# Patient Record
Sex: FEMALE | Race: WHITE | Employment: STUDENT | URBAN - METROPOLITAN AREA
[De-identification: names, ages, dates, MRNs, and addresses within clinical notes are randomized per-mention and may not be internally consistent; named-entity substitution may affect disease eponyms.]

---

## 2017-10-24 PROBLEM — N92.6 IRREGULAR MENSES: Status: ACTIVE | Noted: 2017-10-24

## 2017-10-24 PROBLEM — Z01.419 WOMEN'S ANNUAL ROUTINE GYNECOLOGICAL EXAMINATION: Status: ACTIVE | Noted: 2017-10-24

## 2017-11-07 PROBLEM — Z00.00 ANNUAL PHYSICAL EXAM: Status: ACTIVE | Noted: 2017-11-07

## 2017-11-14 PROBLEM — Z30.432 ENCOUNTER FOR IUD REMOVAL: Status: ACTIVE | Noted: 2017-11-14

## 2018-06-04 PROBLEM — Z30.432 ENCOUNTER FOR IUD REMOVAL: Status: RESOLVED | Noted: 2017-11-14 | Resolved: 2018-06-04

## 2018-06-04 PROBLEM — E66.01 SEVERE OBESITY (BMI 35.0-39.9): Status: ACTIVE | Noted: 2018-06-04

## 2020-02-29 ENCOUNTER — HOSPITAL ENCOUNTER (OUTPATIENT)
Age: 30
Discharge: HOME | End: 2020-02-29
Attending: OBSTETRICS & GYNECOLOGY | Admitting: OBSTETRICS & GYNECOLOGY
Payer: COMMERCIAL

## 2020-02-29 VITALS
HEART RATE: 84 BPM | WEIGHT: 230 LBS | SYSTOLIC BLOOD PRESSURE: 143 MMHG | BODY MASS INDEX: 36.1 KG/M2 | RESPIRATION RATE: 18 BRPM | HEIGHT: 67 IN | DIASTOLIC BLOOD PRESSURE: 74 MMHG | TEMPERATURE: 97.5 F

## 2020-02-29 PROBLEM — O20.9 VAGINAL BLEEDING BEFORE 22 WEEKS GESTATION: Status: ACTIVE | Noted: 2020-02-29

## 2020-02-29 LAB
GLUCOSE, GLUUPC: NEGATIVE
KETONES UR-MCNC: NORMAL MG/DL
PROT UR QL: NORMAL

## 2020-02-29 PROCEDURE — 81002 URINALYSIS NONAUTO W/O SCOPE: CPT | Performed by: OBSTETRICS & GYNECOLOGY

## 2020-02-29 PROCEDURE — 99284 EMERGENCY DEPT VISIT MOD MDM: CPT

## 2020-02-29 RX ORDER — METFORMIN HYDROCHLORIDE 500 MG/1
500 TABLET ORAL
COMMUNITY

## 2020-02-29 NOTE — PROGRESS NOTES
Discharge instructions reviewed with patient and spouse, both verbalize understanding; opportunity to ask questions and voice concerns offered   Patient discharged in stable condition with spouse

## 2020-02-29 NOTE — PROGRESS NOTES
Pt to [facility] ED with report of bright red bleeding this morning when \"wiped\"  Patient denies pain or abdominal cramping at present.   Patient is a G1 with infertility issues, this baby is IVF  Hx of vaginal bleeding at 12 weeks with a \"possible placenta previa\"  Patient has been on pelvic rest since 12 weeks gestation

## 2020-02-29 NOTE — DISCHARGE INSTRUCTIONS
Patient Education     Vaginal Bleeding After the First Trimester of Pregnancy: After Your Visit  Your Care Instructions  Several things can cause bleeding in the second or third trimester of pregnancy. Some of them are serious, but others are not a cause for worry. In some cases, the cause of bleeding is not known. You do not have one of the serious conditions that can cause bleeding in pregnancy. Your baby is fine. However, you need to contact your doctor about any future bleeding, which could be a sign of  labor or other problems. Follow-up care is a key part of your treatment and safety. Be sure to make and go to all appointments, and call your doctor if you are having problems. Its also a good idea to know your test results and keep a list of the medicines you take. How can you care for yourself at home? · Do not have sex until your doctor says it is safe. · Follow your doctor's instructions about how much activity is safe for you. When should you call for help? Call 911 anytime you think you may need emergency care. For example, call if:  · You passed out (lost consciousness). · You have severe vaginal bleeding. · You have severe pain in your belly or pelvis. · You have had fluid gushing or leaking from your vagina and you know or think the umbilical cord is bulging into your vagina. If this happens, immediately get down on your knees so your rear end (buttocks) is higher than your head. This will decrease the pressure on the cord until help arrives. Call your doctor now or seek immediate medical care if:  · You have signs of preeclampsia, such as:  ¨ Sudden swelling of your face, hands, or feet. ¨ New vision problems (such as dimness or blurring). ¨ A severe headache. · You have any vaginal bleeding. · You have belly pain or cramping. · You have a fever. · You have had regular contractions (with or without pain) for an hour.  This means that you have 8 or more within 1 hour or 4 or more in 20 minutes after you change your position and drink fluids. · You have a sudden release of fluid from the vagina. · You have low back pain or pelvic pressure that does not go away. · You notice that your baby has stopped moving or is moving much less than normal.  Watch closely for changes in your health, and be sure to contact your doctor if:  · You do not get better as expected. Where can you learn more? Go to IRIS.TV.be  Enter I318 in the search box to learn more about \"Vaginal Bleeding After the First Trimester of Pregnancy: After Your Visit. \"   © 4294-6687 Healthwise, Incorporated. Care instructions adapted under license by [facility] [employer] (which disclaims liability or warranty for this information). This care instruction is for use with your licensed healthcare professional. If you have questions about a medical condition or this instruction, always ask your healthcare professional. [address] any warranty or liability for your use of this information. Content Version: 9.4.50731;  Last Revised: March 24, 2011        KEEP NEXT SCHEDULED APPOINTMENT WITH [facility]  RETURN TO OB ED IF BRIGHT RED HEAVY BLEEDING OR ANY CONCERNS

## 2020-02-29 NOTE — H&P
Chief Complaint   Patient presents with    Vaginal Bleeding       27 y.o. female  at 17w3d  weeks gestation who requests evaluation for baginal spotting  Severity: mild  Location: vaginal  Duration : 4 hours  Modifying factors: none   Associated s/s: none    Her pregnancy issues include: ivf pregnancy, had spotting before at 12 weeks. Possible with low lying placenta.       HISTORY:  OB History    Para Term  AB Living   1 0 0 0 0 0   SAB TAB Ectopic Molar Multiple Live Births   0 0 0 0 0 0      # Outcome Date GA Lbr Julio/2nd Weight Sex Delivery Anes PTL Lv   1 Current                Past Surgical History:   Procedure Laterality Date    HX OTHER SURGICAL      wisdom teeth     HX WISDOM TEETH EXTRACTION         Past Medical History:   Diagnosis Date    Anxiety 2013    Decreased libido 2013    Elevated blood pressure (not hypertension) 2013    History of chicken pox     Hyperlipidemia 2013    Infertility, female     iVF  [address] Weight gain 2013       No Known Allergies    Family History   Problem Relation Age of Onset    Elevated Lipids Mother     Hypertension Maternal Grandmother     Heart Disease Maternal Grandmother     Diabetes Maternal Grandmother     Stroke Maternal Grandmother     Stroke Maternal Grandfather     Heart Disease Maternal Grandfather     Diabetes Maternal Grandfather     Ovarian Cancer Paternal Aunt     Glaucoma Father     Colon Cancer Neg Hx     Breast Cancer Neg Hx        Social History     Socioeconomic History    Marital status:      Spouse name: Not on file    Number of children: 0    Years of education: bachelors    Highest education level: Not on file   Occupational History    Occupation:    Social Needs    Financial resource strain: Not on file    Food insecurity:     Worry: Not on file     Inability: Not on file    Transportation needs:     Medical: Not on file     Non-medical: Not on file Tobacco Use    Smoking status: Never Smoker    Smokeless tobacco: Never Used   Substance and Sexual Activity    Alcohol use: Yes     Alcohol/week: 2.5 standard drinks     Types: 3 Glasses of wine per week    Drug use: No    Sexual activity: Yes     Partners: Male     Birth control/protection: I.U.D. Comment: paragard 10/16/14   Lifestyle    Physical activity:     Days per week: Not on file     Minutes per session: Not on file    Stress: Not on file   Relationships    Social connections:     Talks on phone: Not on file     Gets together: Not on file     Attends Alevism service: Not on file     Active member of club or organization: Not on file     Attends meetings of clubs or organizations: Not on file     Relationship status: Not on file    Intimate partner violence:     Fear of current or ex partner: Not on file     Emotionally abused: Not on file     Physically abused: Not on file     Forced sexual activity: Not on file   Other Topics Concern     Service Not Asked    Blood Transfusions Not Asked    Caffeine Concern No    Occupational Exposure Not Asked   Dinorah Eglin Hazards Not Asked    Sleep Concern Not Asked    Stress Concern Not Asked    Weight Concern Not Asked    Special Diet Not Asked    Back Care Not Asked    Exercise No    Bike Helmet Not Asked    Seat Belt Not Asked    Self-Exams No   Social History Narrative    Lives with      Denies h/o DV or sexual abuse       ROS:  Negative:   negative 10 point ROS except as noted in HPI    Positive:   per hpi    PHYSICAL EXAM:  Blood pressure 143/74, pulse 84, temperature 97.5 °F (36.4 °C), resp. rate 18, height 5' 6.5\" (1.689 m), weight 104.3 kg (230 lb). The patient appears well, alert, oriented x 3. Appropriate affect. Lungs are clear. Heart RRR, no murmurs. Abdomen soft, non-tender, no rebound/guarding, normoactive bs.   Fundus soft and non tender  Skin warm, dry, no rashes  Ext no edema, DTR's normal  SSE: cervix nullip, has scant amount of blood tinged mucus. Fetal Heart Rate: doppler    Recent Results (from the past 24 hour(s))   POC URINE DIPSTICK MANUAL    Collection Time: 02/29/20  9:57 AM   Result Value Ref Range    Protein (POC) 30 mg/dL Negative    Glucose, urine (POC) Negative Negative    Ketones (POC) > 160 md/dL Negative        TVUS: cervical length 3.2-3.4 on 3 measurements. i performed and read the us. pictures scanned into record. no previa noted. I have personally reviewed the patient's history, prenatal record, and pertinent test results. laboratory results, previous provider notes support my clinical impression. Assessment:  27 y.o. female at 17w3d  Vaginal spotting of undetermined cause    Plan:  Findings and test results were discussed. Fu as scheduled. Continue pelvic rest and return for heavy bleeding, pain, new sx.     Signed By:  Jorje Vera MD     February 29, 2020

## 2020-02-29 NOTE — PROGRESS NOTES
Patient up to bathroom to obtain urine specimen  Patient denies dysuria, urgency, frequency, low back pain, or suprapubic pressure  Patient called RN to bathroom to show amount of blood on toilet tissue; bleeding scant and light pink at present time.

## 2020-03-22 ENCOUNTER — HOSPITAL ENCOUNTER (OUTPATIENT)
Age: 30
Setting detail: OBSERVATION
Discharge: HOME | End: 2020-03-22
Attending: OBSTETRICS & GYNECOLOGY | Admitting: OBSTETRICS & GYNECOLOGY
Payer: COMMERCIAL

## 2020-03-22 VITALS
SYSTOLIC BLOOD PRESSURE: 116 MMHG | OXYGEN SATURATION: 99 % | TEMPERATURE: 98.4 F | DIASTOLIC BLOOD PRESSURE: 57 MMHG | HEART RATE: 71 BPM | RESPIRATION RATE: 18 BRPM

## 2020-03-22 PROBLEM — O44.02 PLACENTA PREVIA IN SECOND TRIMESTER: Status: ACTIVE | Noted: 2020-03-22

## 2020-03-22 PROBLEM — O46.92 VAGINAL BLEEDING IN PREGNANCY, SECOND TRIMESTER: Status: ACTIVE | Noted: 2020-03-22

## 2020-03-22 LAB
ABO + RH BLD: NORMAL
BASOPHILS # BLD: 0 K/UL (ref 0–0.2)
BASOPHILS NFR BLD: 0 % (ref 0–2)
BLOOD GROUP ANTIBODIES SERPL: NORMAL
DIFFERENTIAL METHOD BLD: ABNORMAL
EOSINOPHIL # BLD: 0 K/UL (ref 0–0.8)
EOSINOPHIL NFR BLD: 0 % (ref 0.5–7.8)
ERYTHROCYTE [DISTWIDTH] IN BLOOD BY AUTOMATED COUNT: 13.3 % (ref 11.9–14.6)
HCT VFR BLD AUTO: 33.1 % (ref 35.8–46.3)
HGB BLD-MCNC: 11 G/DL (ref 11.7–15.4)
IMM GRANULOCYTES # BLD AUTO: 0.1 K/UL (ref 0–0.5)
IMM GRANULOCYTES NFR BLD AUTO: 1 % (ref 0–5)
LYMPHOCYTES # BLD: 2.4 K/UL (ref 0.5–4.6)
LYMPHOCYTES NFR BLD: 22 % (ref 13–44)
MCH RBC QN AUTO: 27.9 PG (ref 26.1–32.9)
MCHC RBC AUTO-ENTMCNC: 33.2 G/DL (ref 31.4–35)
MCV RBC AUTO: 84 FL (ref 79.6–97.8)
MONOCYTES # BLD: 0.7 K/UL (ref 0.1–1.3)
MONOCYTES NFR BLD: 7 % (ref 4–12)
NEUTS SEG # BLD: 7.7 K/UL (ref 1.7–8.2)
NEUTS SEG NFR BLD: 70 % (ref 43–78)
NRBC # BLD: 0 K/UL (ref 0–0.2)
PLATELET # BLD AUTO: 205 K/UL (ref 150–450)
PMV BLD AUTO: 10.7 FL (ref 9.4–12.3)
RBC # BLD AUTO: 3.94 M/UL (ref 4.05–5.2)
SPECIMEN EXP DATE BLD: NORMAL
WBC # BLD AUTO: 11 K/UL (ref 4.3–11.1)

## 2020-03-22 PROCEDURE — 76815 OB US LIMITED FETUS(S): CPT | Performed by: OBSTETRICS & GYNECOLOGY

## 2020-03-22 PROCEDURE — 99282 EMERGENCY DEPT VISIT SF MDM: CPT | Performed by: OBSTETRICS & GYNECOLOGY

## 2020-03-22 PROCEDURE — 85025 COMPLETE CBC W/AUTO DIFF WBC: CPT

## 2020-03-22 PROCEDURE — 76817 TRANSVAGINAL US OBSTETRIC: CPT | Performed by: OBSTETRICS & GYNECOLOGY

## 2020-03-22 PROCEDURE — 86900 BLOOD TYPING SEROLOGIC ABO: CPT

## 2020-03-22 PROCEDURE — 99218 HC RM OBSERVATION: CPT

## 2020-03-22 RX ORDER — SODIUM CHLORIDE 0.9 % (FLUSH) 0.9 %
5-40 SYRINGE (ML) INJECTION EVERY 8 HOURS
Status: DISCONTINUED | OUTPATIENT
Start: 2020-03-22 | End: 2020-03-22 | Stop reason: HOSPADM

## 2020-03-22 RX ORDER — ACETAMINOPHEN 325 MG/1
650 TABLET ORAL
Status: DISCONTINUED | OUTPATIENT
Start: 2020-03-22 | End: 2020-03-22 | Stop reason: HOSPADM

## 2020-03-22 RX ORDER — SODIUM CHLORIDE 0.9 % (FLUSH) 0.9 %
5-40 SYRINGE (ML) INJECTION AS NEEDED
Status: DISCONTINUED | OUTPATIENT
Start: 2020-03-22 | End: 2020-03-22 | Stop reason: HOSPADM

## 2020-03-22 NOTE — DISCHARGE SUMMARY
Antepartum Discharge Summary     Name: Salinas Cuello MRN: [id number]  SSN: [id number]    YOB: 1990  Age: 27 y.o. Sex: female      Allergies: Patient has no known allergies. Admit Date: 3/22/2020    Discharge Date: 3/22/2020     Admitting Physician: Chaya Sanchez MD     Attending Physician:  Natanael Childers MD     * Admission Diagnoses: Vaginal bleeding in pregnancy, second trimester [O46.92]  Placenta previa    * Discharge Diagnoses:   Hospital Problems as of 3/22/2020 Date Reviewed: 6/4/2018          Codes Class Noted - Resolved POA    * (Principal) Vaginal bleeding in pregnancy, second trimester ICD-10-CM: O46.92  ICD-9-CM: 641.93  3/22/2020 - Present Unknown        Placenta previa in second trimester ICD-10-CM: O44.02  ICD-9-CM: 641.13  3/22/2020 - Present Yes             Lab Results   Component Value Date/Time    ABO/Rh(D) A POSITIVE 03/22/2020 06:53 AM      There is no immunization history on file for this patient. * Discharge Condition: good    * Procedures: Bedside US: Reassuring fetal status, +FHR, CL 3cm, posterior placenta previa      [facility] Course:    - Admission for vaginal bleeding in second trimester. Has placenta previa. Fetal status reassuring on admission. CL 3cm, posterior previa still present. She has had no further VB. Hgb 11.0. MBT A+, no indication for rho sloan. Patient felt stable for discharge on pelvic rest and modified bed rest. Bleeding precautions reviewed. Follow up in office next week. * Disposition: Home    Discharge Medications:   Current Discharge Medication List      CONTINUE these medications which have NOT CHANGED    Details   PNV No.40-Iron Fum-FA Cmb No.1 27-1 mg tab Take  by mouth.      metFORMIN (GLUCOPHAGE) 500 mg tablet Take 500 mg by mouth daily (with dinner). ALPRAZolam (XANAX) 0.5 mg tablet Take  by mouth.          STOP taking these medications       medroxyPROGESTERone (PROVERA) 10 mg tablet Comments:   Reason for Stopping: * Follow-up Care/Patient Instructions:   Activity: Modified Bedrest - may ambulate and do minor tasks at home, no heavy lifting, no exercise  Diet: Regular Diet  Wound Care: None needed    Follow-up Information     Follow up With Specialties Details Why Contact Info    Arely Mckinley MD [facility]   [address]  [phone]      Arely Mckinely MD [address]      Kade Mahajan MD Obstetrics & Gynecology In 1 week Follow up [facility] visit [address] [facility]. [address] [place] [address]  [phone]

## 2020-03-22 NOTE — PROGRESS NOTES
Patient called out to nursing reporting seeing some bright red blood on pad (small streak) and a streak of bright red blood when wiping. This is the first time bleeding has been bright red since her initial bleeding episode. Given return for bright red blood, I advised patient I would like to monitor her longer today, will reassess at 1300, to see if stable for discharge. If continue or worsening bright bred bleeding, will defer discharge until bleeding stabilizes.       Jory Rodarte MD

## 2020-03-22 NOTE — DISCHARGE INSTRUCTIONS
Patient Education        Vaginal Bleeding During Pregnancy: Care Instructions  Your Care Instructions    Many women have some vaginal bleeding when they are pregnant. In some cases, the bleeding is not serious. And there aren't any more problems with the pregnancy. But sometimes bleeding is a sign of a more serious problem. This is more common if the bleeding is heavy or painful. Examples of more serious problems include miscarriage, an ectopic pregnancy, or a problem with the placenta. You may have to see your doctor again to be sure everything is okay. You may also need more tests to find the cause of the bleeding. For some women, home treatment is all they need. But it depends on what is causing the bleeding. Be sure to tell your doctor if you have any new symptoms or if your symptoms get worse. The doctor has checked you carefully, but problems can develop later. If you notice any problems or new symptoms, get medical treatment right away. Follow-up care is a key part of your treatment and safety. Be sure to make and go to all appointments, and call your doctor if you are having problems. It's also a good idea to know your test results and keep a list of the medicines you take. How can you care for yourself at home? · If your doctor prescribed medicines, take them exactly as directed. Call your doctor if you think you are having a problem with your medicine. · Do not have sex until the bleeding stops and your doctor says it's okay. · Ask your doctor about other activities you can or can't do. · Get a lot of rest. Being pregnant can make you tired. · Eat a healthy diet. Include a lot of peas, beans, and leafy green vegetables. Talk to a dietitian if you need help planning your diet. · Do not use nonsteroidal anti-inflammatory drugs (NSAIDs), such as ibuprofen (Advil, Motrin), naproxen (Aleve), or aspirin, unless your doctor says it is okay. When should you call for help?   Call 911 anytime you think you may need emergency care. For example, call if:    · You passed out (lost consciousness).     · You have severe vaginal bleeding.    Call your doctor now or seek immediate medical care if:    · You have any vaginal bleeding.     · You have pain in your belly or pelvis.     · You think that you are in labor.     · You have a sudden release of fluid from your vagina.     · You notice that your baby has stopped moving or is moving much less than normal.    Watch closely for changes in your health, and be sure to contact your doctor if you have any questions or concerns. Where can you learn more? Go to http://jack-purnima.info/  Enter Q461 in the search box to learn more about \"Vaginal Bleeding During Pregnancy: Care Instructions. \"  Current as of: May 29, 2019Content Version: 12.4  © 6448-0535 [employer]. Care instructions adapted under license by [employer] (which disclaims liability or warranty for this information). If you have questions about a medical condition or this instruction, always ask your healthcare professional. [place] [address] any warranty or liability for your use of this information. Patient Education     Patient Education        Placenta Previa: Care Instructions  Your Care Instructions    The placenta forms during pregnancy. It gives the baby nutrients and oxygen. It also removes waste products. Normally, the placenta attaches to the inner wall of the uterus, away from the opening of the uterus. Sometimes the placenta attaches so low that it blocks part of the opening. This is called placenta previa. Bed rest has not been shown to help prevent problems in most women with placenta previa. But your doctor may recommend that you limit your activities. Your doctor will watch you closely until your baby can be safely delivered. This can be a scary time. Most of the time a  delivery is done.   Follow-up care is a key part of your treatment and safety. Be sure to make and go to all appointments, and call your doctor if you are having problems. It's also a good idea to know your test results and keep a list of the medicines you take. How can you care for yourself at home? · Do not do any heavy activity. Do not run or lift anything that weighs more than 20 pounds. · Have a phone nearby at all times. If you start to bleed, you will need to call your doctor right away. · Tell all doctors and nurses who examine you that you must not have pelvic exams because you have placenta previa. · Ask your doctor if you can have sex. Many doctors recommend that women with placenta previa not have intercourse after 28 weeks of pregnancy. · Do not put anything, such as tampons or douches, into your vagina. Use pads if you are bleeding, and call your doctor. When should you call for help? Call 911 anytime you think you may need emergency care. For example, call if:    · You passed out (lost consciousness).     · You have severe vaginal bleeding.    Call your doctor now or seek immediate medical care if:    · You have any vaginal bleeding.     · You have pain in your belly or pelvis.     · You think that you are in labor.     · You have a sudden release of fluid from your vagina.     · You notice that your baby has stopped moving or is moving much less than normal.    Watch closely for changes in your health, and be sure to contact your doctor if you have any questions or concerns. Where can you learn more? Go to http://jack-purnima.info/  Enter A070 in the search box to learn more about \"Placenta Previa: Care Instructions. \"  Current as of: May 29, 2019Content Version: 12.4  © 6608-4872 Healthwise, Incorporated. Care instructions adapted under license by [employer] (which disclaims liability or warranty for this information).  If you have questions about a medical condition or this instruction, always ask your healthcare professional. [place] [address] any warranty or liability for your use of this information. Learning About When to Call Your Doctor During Pregnancy (After 20 Weeks)  Your Care Instructions  It's common to have concerns about what might be a problem during pregnancy. Although most pregnant women don't have any serious problems, it's important to know when to call your doctor if you have certain symptoms or signs of labor. These are general suggestions. Your doctor may give you some more information about when to call. When to call your doctor (after 20 weeks)  Call 911 anytime you think you may need emergency care. For example, call if:  · You have severe vaginal bleeding. · You have sudden, severe pain in your belly. · You passed out (lost consciousness). · You have a seizure. · You see or feel the umbilical cord. · You think you are about to deliver your baby and can't make it safely to the hospital.  Call your doctor now or seek immediate medical care if:  · You have vaginal bleeding. · You have belly pain. · You have a fever. · You have symptoms of preeclampsia, such as:  ? Sudden swelling of your face, hands, or feet. ? New vision problems (such as dimness, blurring, or seeing spots). ? A severe headache. · You have a sudden release of fluid from your vagina. (You think your water broke.)  · You think that you may be in labor. This means that you've had at least 6 contractions in an hour. · You notice that your baby has stopped moving or is moving much less than normal.  · You have symptoms of a urinary tract infection. These may include:  ? Pain or burning when you urinate. ? A frequent need to urinate without being able to pass much urine. ? Pain in the flank, which is just below the rib cage and above the waist on either side of the back. ? Blood in your urine.   Watch closely for changes in your health, and be sure to contact your doctor if:  · You have vaginal discharge that smells bad. · You have skin changes, such as:  ? A rash. ? Itching. ? Yellow color to your skin. · You have other concerns about your pregnancy. If you have labor signs at 37 weeks or more  If you have signs of labor at 37 weeks or more, your doctor may tell you to call when your labor becomes more active. Symptoms of active labor include:  · Contractions that are regular. · Contractions that are less than 5 minutes apart. · Contractions that are hard to talk through. Follow-up care is a key part of your treatment and safety. Be sure to make and go to all appointments, and call your doctor if you are having problems. It's also a good idea to know your test results and keep a list of the medicines you take. Where can you learn more? Go to http://jack-purnima.info/  Enter N531 in the search box to learn more about \"Learning About When to Call Your Doctor During Pregnancy (After 20 Weeks). \"  Current as of: May 29, 2019Content Version: 12.4  © 4697-6614 Healthwise, Incorporated. Care instructions adapted under license by [employer] (which disclaims liability or warranty for this information). If you have questions about a medical condition or this instruction, always ask your healthcare professional. [address] any warranty or liability for your use of this information.

## 2020-03-22 NOTE — PROGRESS NOTES
Antepartum Progress Note:     S: Ms. Kylee Augustin is admitted with pregnancy at [facility] for vaginal bleeding in setting of placenta previa. Prenatal course was complicated by placenta previa, IVF pregnancy. Patient is doing much better this morning. Patient reports no bleeding since last night. She reports no pain. She is feeling FM. O:   Patient Vitals for the past 12 hrs:   Temp Pulse Resp BP SpO2   03/22/20 0446 98 °F (36.7 °C) 79 16 136/70 99 %      Physical Exam:  Patient without distress. Respirations even, nonlabored  Abdomen/Fundus: Gravid, relaxation between contraction, soft and non tender  Lower Extremities:  - Edema No  Cervical Exam: Deferred. No blood on perineum. Membranes:  Intact    Fetal Heart Rate: Present          A/P:    Vaginal bleeding second trimester  Placenta previa  - Hgb 11.0 on admit  - MBT A+  - Current bleeding: None  - Cervix 3cm length, posterior previa still present  - We discussed spontaneous bleeding can occur due to the unstable location of placental edge over the cervix. We discussed continue pelvic rest. I also advised modified bed rest with limited activity. - Patient given option for continued inpatient monitoring for 24hrs vs discharge. Patient comfortable with discharge. She understands she is high risk for re-bleed. We discussed light bleeding/spotting is acceptable, but she should return for any heavy bleeding or development of pain. - Follow up in office next week.     Kim He MD

## 2020-03-22 NOTE — PROGRESS NOTES
transvag ultrasound-  Cervical length 3 cm  No funneling  Footling breech with cord in front of cervix  Posterior placenta; previa    Subjectively normal vicky  Lots of movement seen

## 2020-03-22 NOTE — PROGRESS NOTES
Patient presents to triage with complaints of bright red vaginal bleeding, described as a large gush. Patient denies LOF or contractions. Doppler 145. [place] placed on soft non-tender abdomen at this time.

## 2020-03-22 NOTE — H&P
History & Physical    Name: Brandyn Delatorre MRN: [id number]  SSN: [id number]    YOB: 1990  Age: 27 y.o. Sex: female    Chief c/o: vaginal bleeding  Subjective:     Reason for Admission:  Pregnancy and Hemorrhage    History of Present Illness: Ms. Gem Chavira is a 27 y.o.  female with an estimated gestational age of 22w2d with Estimated Date of Delivery: 20. Patient complains of moderate vaginal bleeding for 1 days. Pregnancy has been complicated by infertility and ivf. Pt has a known placenta previa. Patient denies abdominal pain  , contractions and vaginal leaking of fluid . Pt woke from sleep with what she and her  describe as \" a lot of blood\" in her bed. No associated symptoms. Not currently bleeding. Pt denies any intercourse. 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 No increased activity. OB History    Para Term  AB Living   1 0 0 0 0 0   SAB TAB Ectopic Molar Multiple Live Births   0 0 0 0 0 0      # Outcome Date GA Lbr Julio/2nd Weight Sex Delivery Anes PTL Lv   1 Current              Past Medical History:   Diagnosis Date    Anxiety 2013    Decreased libido 2013    Elevated blood pressure (not hypertension) 2013    History of chicken pox     Hyperlipidemia 2013    Infertility, female     iVF  Adam  81. Weight gain 2013     Past Surgical History:   Procedure Laterality Date    HX OTHER SURGICAL      wisdom teeth     HX WISDOM TEETH EXTRACTION       Social History     Occupational History    Occupation:    Tobacco Use    Smoking status: Never Smoker    Smokeless tobacco: Never Used   Substance and Sexual Activity    Alcohol use: Yes     Alcohol/week: 2.5 standard drinks     Types: 3 Glasses of wine per week    Drug use: No    Sexual activity: Yes     Partners: Male     Birth control/protection: I.U.D.      Comment: paragard 10/16/14      Family History   Problem Relation Age of Onset  Elevated Lipids Mother     Hypertension Maternal Grandmother     Heart Disease Maternal Grandmother     Diabetes Maternal Grandmother     Stroke Maternal Grandmother     Stroke Maternal Grandfather     Heart Disease Maternal Grandfather     Diabetes Maternal Grandfather     Ovarian Cancer Paternal Aunt     Glaucoma Father     Colon Cancer Neg Hx     Breast Cancer Neg Hx        No Known Allergies  Prior to Admission medications    Medication Sig Start Date End Date Taking? Authorizing Provider   PNV No.40-Iron Fum-FA Cmb No.1 27-1 mg tab Take  by mouth. Yes Provider, Historical   metFORMIN (GLUCOPHAGE) 500 mg tablet Take 500 mg by mouth daily (with dinner). Yes Provider, Historical   medroxyPROGESTERone (PROVERA) 10 mg tablet Take 1 tab by mouth x 14 days each month as directed 5/16/18   Gabino Oconnor MD   ALPRAZolam Leobardo Garcia) 0.5 mg tablet Take  by mouth. Provider, Historical        Review of Systems:  a 12 point review of systems was negative except as written in HPI. Objective:     Vitals:    Vitals:    03/22/20 0446   BP: 136/70   Pulse: 79      No data recorded.     BP  Min: 136/70  Max: 136/70     Physical Exam:  Patient without distress except anxiety over bleeding episode  Heart: Regular rate and rhythm  Lung: clear to auscultation throughout lung fields, no wheezes, no rales, no rhonchi and normal respiratory effort  Back: costovertebral angle tenderness absent  Abdomen: soft, nontender  Fundus: soft and non tender  Perineum: blood present, amniotic fluid absent  Cervical Exam: transvag exam- cervical length 3.0 cm; blood present on exam  Lower Extremities:  - Edema No   - Patellar Reflexes: 2+ bilaterally      Bedside ultrasound- subjectively normal fluid; lots of fetal movement  Trans vag ultrasound- cervical length 3.0 cm on several measurements; lots of fetal movement, baby is footling breech with cord just above cervix, no funneling, no funneling with fundal pressure  Blood present at vulva and on ultrasound probe does not seem to have bleeding currently     Uterine Activity:  None  Fetal Heart Rate:  Baseline: 130 per minute       Lab/Data Review:  No results found for this or any previous visit (from the past 24 hour(s)). Assessment and Plan: Active Problems:    Vaginal bleeding in pregnancy, second trimester (3/22/2020)       Pt with episode of vaginal bleeding. known posterior placenta with previa   Will monitor today. Labs pending.

## 2020-04-24 ENCOUNTER — HOSPITAL ENCOUNTER (INPATIENT)
Age: 30
LOS: 1 days | Discharge: OTHER INSTITUTION | DRG: 833 | End: 2020-04-25
Attending: OBSTETRICS & GYNECOLOGY | Admitting: OBSTETRICS & GYNECOLOGY
Payer: COMMERCIAL

## 2020-04-24 PROBLEM — O46.93 VAGINAL BLEEDING IN PREGNANCY, THIRD TRIMESTER: Status: ACTIVE | Noted: 2020-04-24

## 2020-04-24 PROBLEM — O44.03 PLACENTA PREVIA ANTEPARTUM IN THIRD TRIMESTER: Status: ACTIVE | Noted: 2020-04-24

## 2020-04-24 LAB
ABO + RH BLD: NORMAL
ALBUMIN SERPL-MCNC: 2.8 G/DL (ref 3.5–5)
ALBUMIN/GLOB SERPL: 0.6 {RATIO} (ref 1.2–3.5)
ALP SERPL-CCNC: 84 U/L (ref 50–136)
ALT SERPL-CCNC: 17 U/L (ref 12–65)
ANION GAP SERPL CALC-SCNC: 12 MMOL/L (ref 7–16)
APTT PPP: 32 SEC (ref 24.3–35.4)
AST SERPL-CCNC: 13 U/L (ref 15–37)
BASOPHILS # BLD: 0 K/UL (ref 0–0.2)
BASOPHILS NFR BLD: 0 % (ref 0–2)
BILIRUB SERPL-MCNC: 0.1 MG/DL (ref 0.2–1.1)
BLOOD GROUP ANTIBODIES SERPL: NORMAL
BUN SERPL-MCNC: 6 MG/DL (ref 6–23)
CALCIUM SERPL-MCNC: 9 MG/DL (ref 8.3–10.4)
CHLORIDE SERPL-SCNC: 107 MMOL/L (ref 98–107)
CO2 SERPL-SCNC: 18 MMOL/L (ref 21–32)
CREAT SERPL-MCNC: 0.57 MG/DL (ref 0.6–1)
DIFFERENTIAL METHOD BLD: ABNORMAL
EOSINOPHIL # BLD: 0 K/UL (ref 0–0.8)
EOSINOPHIL NFR BLD: 0 % (ref 0.5–7.8)
ERYTHROCYTE [DISTWIDTH] IN BLOOD BY AUTOMATED COUNT: 13.2 % (ref 11.9–14.6)
FIBRINOGEN PPP-MCNC: 680 MG/DL (ref 190–501)
GLOBULIN SER CALC-MCNC: 4.4 G/DL (ref 2.3–3.5)
GLUCOSE SERPL-MCNC: 129 MG/DL (ref 65–100)
HCT VFR BLD AUTO: 34.9 % (ref 35.8–46.3)
HGB BLD-MCNC: 11.7 G/DL (ref 11.7–15.4)
IMM GRANULOCYTES # BLD AUTO: 0.1 K/UL (ref 0–0.5)
IMM GRANULOCYTES NFR BLD AUTO: 0 % (ref 0–5)
INR PPP: 1
LYMPHOCYTES # BLD: 3 K/UL (ref 0.5–4.6)
LYMPHOCYTES NFR BLD: 20 % (ref 13–44)
MCH RBC QN AUTO: 27.5 PG (ref 26.1–32.9)
MCHC RBC AUTO-ENTMCNC: 33.5 G/DL (ref 31.4–35)
MCV RBC AUTO: 82.1 FL (ref 79.6–97.8)
MONOCYTES # BLD: 1.1 K/UL (ref 0.1–1.3)
MONOCYTES NFR BLD: 7 % (ref 4–12)
NEUTS SEG # BLD: 10.4 K/UL (ref 1.7–8.2)
NEUTS SEG NFR BLD: 72 % (ref 43–78)
NRBC # BLD: 0 K/UL (ref 0–0.2)
PLATELET # BLD AUTO: 255 K/UL (ref 150–450)
PMV BLD AUTO: 10.8 FL (ref 9.4–12.3)
POTASSIUM SERPL-SCNC: 3.3 MMOL/L (ref 3.5–5.1)
PROT SERPL-MCNC: 7.2 G/DL (ref 6.3–8.2)
PROTHROMBIN TIME: 13.9 SEC (ref 12–14.7)
RBC # BLD AUTO: 4.25 M/UL (ref 4.05–5.2)
SODIUM SERPL-SCNC: 137 MMOL/L (ref 136–145)
SPECIMEN EXP DATE BLD: NORMAL
WBC # BLD AUTO: 14.6 K/UL (ref 4.3–11.1)

## 2020-04-24 PROCEDURE — 99285 EMERGENCY DEPT VISIT HI MDM: CPT

## 2020-04-24 PROCEDURE — 85384 FIBRINOGEN ACTIVITY: CPT

## 2020-04-24 PROCEDURE — 85730 THROMBOPLASTIN TIME PARTIAL: CPT

## 2020-04-24 PROCEDURE — 80053 COMPREHEN METABOLIC PANEL: CPT

## 2020-04-24 PROCEDURE — 65270000029 HC RM PRIVATE

## 2020-04-24 PROCEDURE — 85610 PROTHROMBIN TIME: CPT

## 2020-04-24 PROCEDURE — 74011250636 HC RX REV CODE- 250/636: Performed by: OBSTETRICS & GYNECOLOGY

## 2020-04-24 PROCEDURE — 85025 COMPLETE CBC W/AUTO DIFF WBC: CPT

## 2020-04-24 PROCEDURE — 86900 BLOOD TYPING SEROLOGIC ABO: CPT

## 2020-04-24 PROCEDURE — 96360 HYDRATION IV INFUSION INIT: CPT

## 2020-04-24 PROCEDURE — 59025 FETAL NON-STRESS TEST: CPT

## 2020-04-24 PROCEDURE — 74011250637 HC RX REV CODE- 250/637: Performed by: OBSTETRICS & GYNECOLOGY

## 2020-04-24 PROCEDURE — 76815 OB US LIMITED FETUS(S): CPT

## 2020-04-24 RX ORDER — BETAMETHASONE SODIUM PHOSPHATE AND BETAMETHASONE ACETATE 3; 3 MG/ML; MG/ML
12 INJECTION, SUSPENSION INTRA-ARTICULAR; INTRALESIONAL; INTRAMUSCULAR; SOFT TISSUE ONCE
Status: DISCONTINUED | OUTPATIENT
Start: 2020-04-25 | End: 2020-04-25 | Stop reason: HOSPADM

## 2020-04-24 RX ORDER — ACETAMINOPHEN 325 MG/1
650 TABLET ORAL
Status: DISCONTINUED | OUTPATIENT
Start: 2020-04-24 | End: 2020-04-25 | Stop reason: HOSPADM

## 2020-04-24 RX ORDER — FAMOTIDINE 20 MG/1
20 TABLET, FILM COATED ORAL EVERY EVENING
Status: DISCONTINUED | OUTPATIENT
Start: 2020-04-25 | End: 2020-04-25 | Stop reason: HOSPADM

## 2020-04-24 RX ORDER — LORAZEPAM 1 MG/1
1 TABLET ORAL
Status: DISCONTINUED | OUTPATIENT
Start: 2020-04-24 | End: 2020-04-25 | Stop reason: HOSPADM

## 2020-04-24 RX ORDER — BETAMETHASONE SODIUM PHOSPHATE AND BETAMETHASONE ACETATE 3; 3 MG/ML; MG/ML
12 INJECTION, SUSPENSION INTRA-ARTICULAR; INTRALESIONAL; INTRAMUSCULAR; SOFT TISSUE EVERY 24 HOURS
Status: DISCONTINUED | OUTPATIENT
Start: 2020-04-24 | End: 2020-04-24 | Stop reason: SDUPTHER

## 2020-04-24 RX ORDER — POTASSIUM CHLORIDE 20 MEQ/1
20 TABLET, EXTENDED RELEASE ORAL 2 TIMES DAILY
Status: DISCONTINUED | OUTPATIENT
Start: 2020-04-25 | End: 2020-04-25 | Stop reason: HOSPADM

## 2020-04-24 RX ORDER — MAGNESIUM SULFATE HEPTAHYDRATE 40 MG/ML
1 INJECTION, SOLUTION INTRAVENOUS CONTINUOUS
Status: DISCONTINUED | OUTPATIENT
Start: 2020-04-24 | End: 2020-04-25 | Stop reason: HOSPADM

## 2020-04-24 RX ORDER — DOCUSATE SODIUM 100 MG/1
100 CAPSULE, LIQUID FILLED ORAL 2 TIMES DAILY
Status: DISCONTINUED | OUTPATIENT
Start: 2020-04-25 | End: 2020-04-25 | Stop reason: HOSPADM

## 2020-04-24 RX ORDER — MAGNESIUM SULFATE HEPTAHYDRATE 40 MG/ML
4 INJECTION, SOLUTION INTRAVENOUS ONCE
Status: COMPLETED | OUTPATIENT
Start: 2020-04-24 | End: 2020-04-24

## 2020-04-24 RX ORDER — ONDANSETRON 4 MG/1
4 TABLET, ORALLY DISINTEGRATING ORAL
Status: DISCONTINUED | OUTPATIENT
Start: 2020-04-24 | End: 2020-04-25 | Stop reason: HOSPADM

## 2020-04-24 RX ORDER — SODIUM CHLORIDE 0.9 % (FLUSH) 0.9 %
5-40 SYRINGE (ML) INJECTION EVERY 8 HOURS
Status: DISCONTINUED | OUTPATIENT
Start: 2020-04-24 | End: 2020-04-25 | Stop reason: HOSPADM

## 2020-04-24 RX ORDER — SODIUM CHLORIDE 0.9 % (FLUSH) 0.9 %
5-40 SYRINGE (ML) INJECTION AS NEEDED
Status: DISCONTINUED | OUTPATIENT
Start: 2020-04-24 | End: 2020-04-25 | Stop reason: HOSPADM

## 2020-04-24 RX ADMIN — MAGNESIUM SULFATE IN WATER 4 G: 40 INJECTION, SOLUTION INTRAVENOUS at 22:35

## 2020-04-24 RX ADMIN — LORAZEPAM 1 MG: 1 TABLET ORAL at 22:55

## 2020-04-24 RX ADMIN — MAGNESIUM SULFATE IN WATER 1 G/HR: 40 INJECTION, SOLUTION INTRAVENOUS at 22:59

## 2020-04-24 RX ADMIN — BETAMETHASONE SODIUM PHOSPHATE AND BETAMETHASONE ACETATE 12 MG: 3; 3 INJECTION, SUSPENSION INTRA-ARTICULAR; INTRALESIONAL; INTRAMUSCULAR at 22:05

## 2020-04-25 VITALS
SYSTOLIC BLOOD PRESSURE: 139 MMHG | DIASTOLIC BLOOD PRESSURE: 64 MMHG | TEMPERATURE: 97.8 F | WEIGHT: 234 LBS | HEART RATE: 122 BPM | BODY MASS INDEX: 37.61 KG/M2 | OXYGEN SATURATION: 97 % | HEIGHT: 66 IN | RESPIRATION RATE: 20 BRPM

## 2020-04-25 NOTE — H&P
History & Physical    Name: Cassidy Snow MRN: [id number]  SSN: [id number]    YOB: 1990  Age: 27 y.o. Sex: female      Subjective:     Reason for Admission:  Pregnancy and Placenta Previa    History of Present Illness: Ms. Warren Panchal is a 27 y.o.  female with an estimated gestational age of 24w4d with Estimated Date of Delivery: 20. Patient complains of moderate vaginal bleeding . Pregnancy has been complicated by placenta previa. Patient denies abdominal pain  , contractions, fever, nausea and vomiting, pelvic pressure, shortness of breath and swelling. OB History    Para Term  AB Living   1 0 0 0 0 0   SAB TAB Ectopic Molar Multiple Live Births   0 0 0 0 0 0      # Outcome Date GA Lbr Julio/2nd Weight Sex Delivery Anes PTL Lv   1 Current              Past Medical History:   Diagnosis Date    Anxiety 2013    Decreased libido 2013    Elevated blood pressure (not hypertension) 2013    History of chicken pox     Hyperlipidemia 2013    Infertility, female     iVF  Adam  81. Weight gain 2013     Past Surgical History:   Procedure Laterality Date    HX OTHER SURGICAL      wisdom teeth     HX WISDOM TEETH EXTRACTION       Social History     Occupational History    Occupation:    Tobacco Use    Smoking status: Never Smoker    Smokeless tobacco: Never Used   Substance and Sexual Activity    Alcohol use: Yes     Alcohol/week: 2.5 standard drinks     Types: 3 Glasses of wine per week    Drug use: No    Sexual activity: Yes     Partners: Male     Birth control/protection: I.U.D.      Comment: paragard 10/16/14      Family History   Problem Relation Age of Onset    Elevated Lipids Mother     Hypertension Maternal Grandmother     Heart Disease Maternal Grandmother     Diabetes Maternal Grandmother     Stroke Maternal Grandmother     Stroke Maternal Grandfather     Heart Disease Maternal Grandfather     Diabetes Maternal Grandfather     Ovarian Cancer Paternal Aunt     Glaucoma Father     Colon Cancer Neg Hx     Breast Cancer Neg Hx        No Known Allergies  Prior to Admission medications    Medication Sig Start Date End Date Taking? Authorizing Provider   PNV No.40-Iron Fum-FA Cmb No.1 27-1 mg tab Take  by mouth. Yes Provider, Historical   metFORMIN (GLUCOPHAGE) 500 mg tablet Take 500 mg by mouth daily (with dinner). Yes Provider, Historical   ALPRAZolam (XANAX) 0.5 mg tablet Take  by mouth. Provider, Historical        Review of Systems:  A comprehensive review of systems was negative except for that written in the History of Present Illness. Objective:     Vitals:    Vitals:    208 20 2130 20   BP: 138/84      Pulse: (!) 114      Resp: 22      Temp: 98.2 °F (36.8 °C)      SpO2: 92% 97% 99% 99%      Temp (24hrs), Av.2 °F (36.8 °C), Min:98.2 °F (36.8 °C), Max:98.2 °F (36.8 °C)    BP  Min: 138/84  Max: 138/84     Physical Exam:  Patient without distress.   Heart: Regular rate and rhythm  Lung: clear to auscultation throughout lung fields, no wheezes, no rales, no rhonchi and normal respiratory effort  Back: costovertebral angle tenderness absent  Abdomen: soft, nontender  Fundus: soft and non tender  Perineum: blood present, amniotic fluid absent  Cervical Exam: deferred- cervical length appears around 3.7 cm  Lower Extremities:  - Edema No   - Patellar Reflexes: 2+ bilaterally  Skin- no rashes , no lesions  Psych- appropriately anxious     Membranes:  Intact  Uterine Activity:  None  Fetal Heart Rate:  Baseline: 150 per minute  Variability: moderate  Accelerations: yes  Decelerations: none  Uterine contractions: none       Bedside ultrasound- vertex, vicky = 14.9, lots of fetal movement seen, posterior placenta grade 1  Cervical length - approx 3.7    Lab/Data Review:  Recent Results (from the past 24 hour(s))   CBC WITH AUTOMATED DIFF    Collection Time: 20 9:42 PM   Result Value Ref Range    WBC 14.6 (H) 4.3 - 11.1 K/uL    RBC 4.25 4.05 - 5.2 M/uL    HGB 11.7 11.7 - 15.4 g/dL    HCT 34.9 (L) 35.8 - 46.3 %    MCV 82.1 79.6 - 97.8 FL    MCH 27.5 26.1 - 32.9 PG    MCHC 33.5 31.4 - 35.0 g/dL    RDW 13.2 11.9 - 14.6 %    PLATELET 396 486 - 459 K/uL    MPV 10.8 9.4 - 12.3 FL    ABSOLUTE NRBC 0.00 0.0 - 0.2 K/uL    DF AUTOMATED      NEUTROPHILS 72 43 - 78 %    LYMPHOCYTES 20 13 - 44 %    MONOCYTES 7 4.0 - 12.0 %    EOSINOPHILS 0 (L) 0.5 - 7.8 %    BASOPHILS 0 0.0 - 2.0 %    IMMATURE GRANULOCYTES 0 0.0 - 5.0 %    ABS. NEUTROPHILS 10.4 (H) 1.7 - 8.2 K/UL    ABS. LYMPHOCYTES 3.0 0.5 - 4.6 K/UL    ABS. MONOCYTES 1.1 0.1 - 1.3 K/UL    ABS. EOSINOPHILS 0.0 0.0 - 0.8 K/UL    ABS. BASOPHILS 0.0 0.0 - 0.2 K/UL    ABS. IMM. GRANS. 0.1 0.0 - 0.5 K/UL   METABOLIC PANEL, COMPREHENSIVE    Collection Time: 04/24/20  9:42 PM   Result Value Ref Range    Sodium 137 136 - 145 mmol/L    Potassium 3.3 (L) 3.5 - 5.1 mmol/L    Chloride 107 98 - 107 mmol/L    CO2 18 (L) 21 - 32 mmol/L    Anion gap 12 7 - 16 mmol/L    Glucose 129 (H) 65 - 100 mg/dL    BUN 6 6 - 23 MG/DL    Creatinine 0.57 (L) 0.6 - 1.0 MG/DL    GFR est AA >60 >60 ml/min/1.73m2    GFR est non-AA >60 >60 ml/min/1.73m2    Calcium 9.0 8.3 - 10.4 MG/DL    Bilirubin, total 0.1 (L) 0.2 - 1.1 MG/DL    ALT (SGPT) 17 12 - 65 U/L    AST (SGOT) 13 (L) 15 - 37 U/L    Alk.  phosphatase 84 50 - 136 U/L    Protein, total 7.2 6.3 - 8.2 g/dL    Albumin 2.8 (L) 3.5 - 5.0 g/dL    Globulin 4.4 (H) 2.3 - 3.5 g/dL    A-G Ratio 0.6 (L) 1.2 - 3.5     PROTHROMBIN TIME + INR    Collection Time: 04/24/20  9:42 PM   Result Value Ref Range    Prothrombin time 13.9 12.0 - 14.7 sec    INR 1.0     PTT    Collection Time: 04/24/20  9:42 PM   Result Value Ref Range    aPTT 32.0 24.3 - 35.4 SEC   FIBRINOGEN    Collection Time: 04/24/20  9:42 PM   Result Value Ref Range    Fibrinogen 680 (H) 190 - 501 mg/dL   TYPE & SCREEN    Collection Time: 04/24/20  9:43 PM   Result Value Ref Range    Crossmatch Expiration 04/27/2020     ABO/Rh(D) A POSITIVE     Antibody screen NEG        Assessment and Plan:      Active Problems:    Vaginal bleeding in pregnancy, third trimester (4/24/2020)      Placenta previa antepartum in third trimester (4/24/2020)       28 yo G1 at 25w2d with known placenta previa with episode tonight of moderate bleeding  No current bleeding  Labs stable  Repeat cbc in am  Betamethasone  Magnesium for neuroprotection  Do not anticipate delivery

## 2020-04-25 NOTE — PROGRESS NOTES
Phone call to Cristal Tapia Nurse RN at [facility] to give report on patient for transfer of care. Per Scott Taylor patient will be going to room 12 when she arrives.

## 2020-04-25 NOTE — PROGRESS NOTES
Called to bedside for new episode of bleeding. Pt had been resting quietly without any bleeding and she called out around 02:20 for bleeding. Pt passed a small approx 3 cm clot and then another smaller clot. Then bleeding stopped. FHT's 130's with mod variability and accels  Bedside ultrasound showed good fetal movement and no change in fluid from previous. Decision made to transfer to [facility] for second episode of vaginal bleeding at 25w3d. Pt has received one dose of betamethasone at 22:00. She has also been on magnesium with a 4 gram loading dose and then 1 gram/ hr.  Will increase to 2 gram / hr because pt now complaining of some mild crampiness- this is a new complaint for her. Baby is vertex.   Dr. Valeria Somers [facility] accepted transfer

## 2020-04-25 NOTE — PROGRESS NOTES
Pt presented in [facility] after calling the ED dept stating that patient was bleeding heavily. Pt arrived in room via wheelchair. Peripad with dark red blood on it observed. Pt states that she had felt poorly most of the day with lightheadedness and muscle aches. After a bath, pt states she \"filled the toilet with blood and clots\". Pt to bed and placed on EFM. FHTs 140    Dr. Ky Tee arrived at the bedside. Abdominal ultrasound and pelvic ultrasound performed. See her notes. [facility]

## 2020-04-25 NOTE — PROGRESS NOTES
2951-In patients room to respond to call bell. Patient states \"bleeding started again. Per patient bleeding had been almost completely done and then she felt a gush again and called out. Dr. Cindy Donaldson called to room. 2772- Dr. Cindy Donaldson at bedside. 6474- Ultrasound performed on patient by Dr. Cindy Donaldson. Dr. Reyes Quan made aware of above.